# Patient Record
Sex: MALE | HISPANIC OR LATINO | URBAN - METROPOLITAN AREA
[De-identification: names, ages, dates, MRNs, and addresses within clinical notes are randomized per-mention and may not be internally consistent; named-entity substitution may affect disease eponyms.]

---

## 2024-04-17 ENCOUNTER — APPOINTMENT (OUTPATIENT)
Dept: URBAN - METROPOLITAN AREA CLINIC 304 | Age: 54
Setting detail: DERMATOLOGY
End: 2024-04-22

## 2024-04-17 DIAGNOSIS — L21.8 OTHER SEBORRHEIC DERMATITIS: ICD-10-CM

## 2024-04-17 DIAGNOSIS — L91.8 OTHER HYPERTROPHIC DISORDERS OF THE SKIN: ICD-10-CM

## 2024-04-17 PROCEDURE — OTHER PRESCRIPTION MEDICATION MANAGEMENT: OTHER

## 2024-04-17 PROCEDURE — OTHER MIPS QUALITY: OTHER

## 2024-04-17 PROCEDURE — 99203 OFFICE O/P NEW LOW 30 MIN: CPT

## 2024-04-17 PROCEDURE — OTHER REASSURANCE: OTHER

## 2024-04-17 PROCEDURE — OTHER PRESCRIPTION: OTHER

## 2024-04-17 PROCEDURE — OTHER ADDITIONAL NOTES: OTHER

## 2024-04-17 PROCEDURE — OTHER COUNSELING: OTHER

## 2024-04-17 RX ORDER — KETOCONAZOLE 20 MG/ML
SHAMPOO, SUSPENSION TOPICAL BIW
Qty: 240 | Refills: 6 | Status: ERX | COMMUNITY
Start: 2024-04-17

## 2024-04-17 RX ORDER — DESONIDE 0.5 MG/G
CREAM TOPICAL BID
Qty: 60 | Refills: 1 | Status: ERX | COMMUNITY
Start: 2024-04-17

## 2024-04-17 ASSESSMENT — LOCATION SIMPLE DESCRIPTION DERM
LOCATION SIMPLE: RIGHT AXILLARY VAULT
LOCATION SIMPLE: LEFT FOREHEAD
LOCATION SIMPLE: LEFT AXILLARY VAULT
LOCATION SIMPLE: LEFT POSTERIOR AXILLA
LOCATION SIMPLE: NOSE
LOCATION SIMPLE: RIGHT TEMPLE

## 2024-04-17 ASSESSMENT — LOCATION DETAILED DESCRIPTION DERM
LOCATION DETAILED: LEFT MEDIAL FOREHEAD
LOCATION DETAILED: LEFT POSTERIOR AXILLA
LOCATION DETAILED: RIGHT MID TEMPLE
LOCATION DETAILED: RIGHT AXILLARY VAULT
LOCATION DETAILED: NASAL SUPRATIP
LOCATION DETAILED: LEFT AXILLARY VAULT

## 2024-04-17 ASSESSMENT — LOCATION ZONE DERM
LOCATION ZONE: AXILLAE
LOCATION ZONE: NOSE
LOCATION ZONE: FACE

## 2024-04-17 ASSESSMENT — SEVERITY ASSESSMENT: HOW SEVERE IS THIS PATIENT'S CONDITION?: MILD

## 2024-04-17 NOTE — PROCEDURE: PRESCRIPTION MEDICATION MANAGEMENT
Initiate Treatment: ketoconazole 2 % shampoo BIW\\nQuantity: 360.0 ml  Days Supply: 30\\nSig: For itch/dandruff apply daily every other day\\n\\ndesonide 0.05 % topical cream BID\\nQuantity: 60.0 g  Days Supply: 30\\nSig: Apply twice daily to rash face for 2 weeks on and 1 week off repeat as needed.
Detail Level: Zone
Render In Strict Bullet Format?: No

## 2024-08-30 NOTE — PROCEDURE: REASSURANCE
Last Appointment:  8/15/2024  Future Appointments   Date Time Provider Department Center   9/19/2024  1:30 PM Kimberly Sewell MD [facility] DE[facility]   10/8/2024 12:45 PM Kimberly Sewell MD [facility] DE[facility]   11/26/2024 12:45 PM Kimberly Sewell MD [facility] DE[facility]      
Detail Level: Detailed
Hide Additional Notes?: No
Detail Level: Zone

## 2025-06-25 ENCOUNTER — APPOINTMENT (OUTPATIENT)
Dept: URBAN - METROPOLITAN AREA CLINIC 304 | Age: 55
Setting detail: DERMATOLOGY
End: 2025-06-25

## 2025-06-25 ENCOUNTER — RX ONLY (RX ONLY)
Age: 55
End: 2025-06-25

## 2025-06-25 DIAGNOSIS — L21.8 OTHER SEBORRHEIC DERMATITIS: ICD-10-CM

## 2025-06-25 PROCEDURE — OTHER REASSURANCE: OTHER

## 2025-06-25 PROCEDURE — OTHER PRESCRIPTION MEDICATION MANAGEMENT: OTHER

## 2025-06-25 PROCEDURE — 99214 OFFICE O/P EST MOD 30 MIN: CPT

## 2025-06-25 PROCEDURE — OTHER COUNSELING: OTHER

## 2025-06-25 PROCEDURE — OTHER MIPS QUALITY: OTHER

## 2025-06-25 RX ORDER — KETOCONAZOLE 20 MG/ML
SHAMPOO, SUSPENSION TOPICAL BIW
Qty: 240 | Refills: 6 | Status: ERX

## 2025-06-25 RX ORDER — DESONIDE 0.5 MG/G
CREAM TOPICAL BID
Qty: 60 | Refills: 1 | Status: ERX

## 2025-06-25 ASSESSMENT — LOCATION DETAILED DESCRIPTION DERM
LOCATION DETAILED: RIGHT MID TEMPLE
LOCATION DETAILED: NASAL SUPRATIP
LOCATION DETAILED: LEFT MEDIAL FOREHEAD

## 2025-06-25 ASSESSMENT — LOCATION ZONE DERM
LOCATION ZONE: NOSE
LOCATION ZONE: FACE

## 2025-06-25 ASSESSMENT — LOCATION SIMPLE DESCRIPTION DERM
LOCATION SIMPLE: LEFT FOREHEAD
LOCATION SIMPLE: RIGHT TEMPLE
LOCATION SIMPLE: NOSE

## 2025-06-25 ASSESSMENT — SEVERITY ASSESSMENT: HOW SEVERE IS THIS PATIENT'S CONDITION?: MILD
